# Patient Record
Sex: MALE | Race: BLACK OR AFRICAN AMERICAN | NOT HISPANIC OR LATINO | Employment: FULL TIME | URBAN - METROPOLITAN AREA
[De-identification: names, ages, dates, MRNs, and addresses within clinical notes are randomized per-mention and may not be internally consistent; named-entity substitution may affect disease eponyms.]

---

## 2021-05-01 ENCOUNTER — APPOINTMENT (OUTPATIENT)
Dept: GENERAL RADIOLOGY | Facility: HOSPITAL | Age: 31
End: 2021-05-01

## 2021-05-01 ENCOUNTER — HOSPITAL ENCOUNTER (EMERGENCY)
Facility: HOSPITAL | Age: 31
Discharge: HOME | End: 2021-05-01
Attending: EMERGENCY MEDICINE | Admitting: EMERGENCY MEDICINE

## 2021-05-01 VITALS
OXYGEN SATURATION: 96 % | TEMPERATURE: 97.2 F | RESPIRATION RATE: 16 BRPM | HEIGHT: 75 IN | DIASTOLIC BLOOD PRESSURE: 94 MMHG | BODY MASS INDEX: 32.33 KG/M2 | HEART RATE: 78 BPM | WEIGHT: 260 LBS | SYSTOLIC BLOOD PRESSURE: 153 MMHG

## 2021-05-01 DIAGNOSIS — B34.8 PARAINFLUENZA VIRUS INFECTION: Primary | ICD-10-CM

## 2021-05-01 LAB
ALBUMIN SERPL-MCNC: 4.1 G/DL (ref 3.5–5.2)
ALBUMIN/GLOB SERPL: 1.2 G/DL
ALP SERPL-CCNC: 101 U/L (ref 39–117)
ALT SERPL W P-5'-P-CCNC: 57 U/L (ref 1–41)
ANION GAP SERPL CALCULATED.3IONS-SCNC: 9.9 MMOL/L (ref 5–15)
AST SERPL-CCNC: 31 U/L (ref 1–40)
B PARAPERT DNA SPEC QL NAA+PROBE: NOT DETECTED
B PERT DNA SPEC QL NAA+PROBE: NOT DETECTED
BASOPHILS # BLD AUTO: 0.04 10*3/MM3 (ref 0–0.2)
BASOPHILS NFR BLD AUTO: 0.8 % (ref 0–1.5)
BILIRUB SERPL-MCNC: 0.2 MG/DL (ref 0–1.2)
BUN SERPL-MCNC: 12 MG/DL (ref 6–20)
BUN/CREAT SERPL: 10.6 (ref 7–25)
C PNEUM DNA NPH QL NAA+NON-PROBE: NOT DETECTED
CALCIUM SPEC-SCNC: 9 MG/DL (ref 8.6–10.5)
CHLORIDE SERPL-SCNC: 100 MMOL/L (ref 98–107)
CO2 SERPL-SCNC: 25.1 MMOL/L (ref 22–29)
CREAT SERPL-MCNC: 1.13 MG/DL (ref 0.76–1.27)
D-LACTATE SERPL-SCNC: 1.4 MMOL/L (ref 0.5–2)
DEPRECATED RDW RBC AUTO: 42 FL (ref 37–54)
EOSINOPHIL # BLD AUTO: 0.57 10*3/MM3 (ref 0–0.4)
EOSINOPHIL NFR BLD AUTO: 10.8 % (ref 0.3–6.2)
ERYTHROCYTE [DISTWIDTH] IN BLOOD BY AUTOMATED COUNT: 14.2 % (ref 12.3–15.4)
FLUAV SUBTYP SPEC NAA+PROBE: NOT DETECTED
FLUBV RNA ISLT QL NAA+PROBE: NOT DETECTED
GFR SERPL CREATININE-BSD FRML MDRD: 91 ML/MIN/1.73
GLOBULIN UR ELPH-MCNC: 3.3 GM/DL
GLUCOSE SERPL-MCNC: 97 MG/DL (ref 65–99)
HADV DNA SPEC NAA+PROBE: NOT DETECTED
HCOV 229E RNA SPEC QL NAA+PROBE: NOT DETECTED
HCOV HKU1 RNA SPEC QL NAA+PROBE: NOT DETECTED
HCOV NL63 RNA SPEC QL NAA+PROBE: NOT DETECTED
HCOV OC43 RNA SPEC QL NAA+PROBE: NOT DETECTED
HCT VFR BLD AUTO: 48.9 % (ref 37.5–51)
HGB BLD-MCNC: 16.2 G/DL (ref 13–17.7)
HMPV RNA NPH QL NAA+NON-PROBE: NOT DETECTED
HOLD SPECIMEN: NORMAL
HOLD SPECIMEN: NORMAL
HPIV1 RNA SPEC QL NAA+PROBE: NOT DETECTED
HPIV2 RNA SPEC QL NAA+PROBE: NOT DETECTED
HPIV3 RNA NPH QL NAA+PROBE: DETECTED
HPIV4 P GENE NPH QL NAA+PROBE: NOT DETECTED
IMM GRANULOCYTES # BLD AUTO: 0.01 10*3/MM3 (ref 0–0.05)
IMM GRANULOCYTES NFR BLD AUTO: 0.2 % (ref 0–0.5)
INR PPP: 0.93 (ref 0.9–1.1)
LYMPHOCYTES # BLD AUTO: 1.92 10*3/MM3 (ref 0.7–3.1)
LYMPHOCYTES NFR BLD AUTO: 36.4 % (ref 19.6–45.3)
M PNEUMO IGG SER IA-ACNC: NOT DETECTED
MAGNESIUM SERPL-MCNC: 1.8 MG/DL (ref 1.6–2.6)
MCH RBC QN AUTO: 27 PG (ref 26.6–33)
MCHC RBC AUTO-ENTMCNC: 33.1 G/DL (ref 31.5–35.7)
MCV RBC AUTO: 81.5 FL (ref 79–97)
MONOCYTES # BLD AUTO: 0.68 10*3/MM3 (ref 0.1–0.9)
MONOCYTES NFR BLD AUTO: 12.9 % (ref 5–12)
NEUTROPHILS NFR BLD AUTO: 2.06 10*3/MM3 (ref 1.7–7)
NEUTROPHILS NFR BLD AUTO: 38.9 % (ref 42.7–76)
NRBC BLD AUTO-RTO: 0 /100 WBC (ref 0–0.2)
NT-PROBNP SERPL-MCNC: <5 PG/ML (ref 0–450)
PLATELET # BLD AUTO: 245 10*3/MM3 (ref 140–450)
PMV BLD AUTO: 10.5 FL (ref 6–12)
POTASSIUM SERPL-SCNC: 3.8 MMOL/L (ref 3.5–5.2)
PROCALCITONIN SERPL-MCNC: 0.07 NG/ML (ref 0–0.25)
PROT SERPL-MCNC: 7.4 G/DL (ref 6–8.5)
PROTHROMBIN TIME: 12.3 SECONDS (ref 11.7–14.2)
RBC # BLD AUTO: 6 10*6/MM3 (ref 4.14–5.8)
RHINOVIRUS RNA SPEC NAA+PROBE: NOT DETECTED
RSV RNA NPH QL NAA+NON-PROBE: NOT DETECTED
SARS-COV-2 RNA NPH QL NAA+NON-PROBE: NOT DETECTED
SODIUM SERPL-SCNC: 135 MMOL/L (ref 136–145)
WBC # BLD AUTO: 5.28 10*3/MM3 (ref 3.4–10.8)
WHOLE BLOOD HOLD SPECIMEN: NORMAL
WHOLE BLOOD HOLD SPECIMEN: NORMAL

## 2021-05-01 PROCEDURE — 99284 EMERGENCY DEPT VISIT MOD MDM: CPT

## 2021-05-01 PROCEDURE — 83880 ASSAY OF NATRIURETIC PEPTIDE: CPT | Performed by: EMERGENCY MEDICINE

## 2021-05-01 PROCEDURE — 85610 PROTHROMBIN TIME: CPT | Performed by: EMERGENCY MEDICINE

## 2021-05-01 PROCEDURE — 71045 X-RAY EXAM CHEST 1 VIEW: CPT

## 2021-05-01 PROCEDURE — 87150 DNA/RNA AMPLIFIED PROBE: CPT | Performed by: EMERGENCY MEDICINE

## 2021-05-01 PROCEDURE — 85025 COMPLETE CBC W/AUTO DIFF WBC: CPT | Performed by: EMERGENCY MEDICINE

## 2021-05-01 PROCEDURE — 83735 ASSAY OF MAGNESIUM: CPT | Performed by: EMERGENCY MEDICINE

## 2021-05-01 PROCEDURE — 80053 COMPREHEN METABOLIC PANEL: CPT | Performed by: EMERGENCY MEDICINE

## 2021-05-01 PROCEDURE — 87147 CULTURE TYPE IMMUNOLOGIC: CPT | Performed by: EMERGENCY MEDICINE

## 2021-05-01 PROCEDURE — 83605 ASSAY OF LACTIC ACID: CPT | Performed by: EMERGENCY MEDICINE

## 2021-05-01 PROCEDURE — 0202U NFCT DS 22 TRGT SARS-COV-2: CPT | Performed by: EMERGENCY MEDICINE

## 2021-05-01 PROCEDURE — 84145 PROCALCITONIN (PCT): CPT | Performed by: EMERGENCY MEDICINE

## 2021-05-01 PROCEDURE — 87040 BLOOD CULTURE FOR BACTERIA: CPT | Performed by: EMERGENCY MEDICINE

## 2021-05-01 RX ORDER — SODIUM CHLORIDE 0.9 % (FLUSH) 0.9 %
10 SYRINGE (ML) INJECTION AS NEEDED
Status: DISCONTINUED | OUTPATIENT
Start: 2021-05-01 | End: 2021-05-01 | Stop reason: HOSPADM

## 2021-05-01 RX ADMIN — SODIUM CHLORIDE, PRESERVATIVE FREE 10 ML: 5 INJECTION INTRAVENOUS at 16:33

## 2021-05-01 NOTE — ED PROVIDER NOTES
EMERGENCY DEPARTMENT ENCOUNTER    Room Number:  19/19  Date of encounter:  5/1/2021  PCP: Provider, No Known  Historian: Patient      HPI:  Chief Complaint: Concerns he has Covid  A complete HPI/ROS/PMH/PSH/SH/FH are unobtainable due to: Not applicable  Context: Leonora Mckeon is a 31 y.o. male who presents to the ED c/o patient returned from [place] 2 days ago.  He was in [place] approximately 10 days.  About 4 days ago he started with some respiratory symptoms described as a scratchy throat, nasal congestion, and is lost the sense of taste and smell.  He has had a mild cough.  He feels weak and fatigued.  Denies any vomiting or diarrhea.  Denies any headache, chest pain, abdominal pain or any pain he had a Covid test done by saliva approximately 2 or 3 days ago at the beginning of his symptoms and it was negative.  He was vaccinated with his first dose of Pfizer he believes on April 16 or 17.  He has no history of heart problems or lung problems.  He does not take any prescription medicines.  Denies any pain or swelling in lower extremities.        Previous Episodes: No  Current Symptoms: See above    MEDICAL HISTORY REVIEWED  No old records in [facility].      PAST MEDICAL HISTORY  Active Ambulatory Problems     Diagnosis Date Noted   • No Active Ambulatory Problems     Resolved Ambulatory Problems     Diagnosis Date Noted   • No Resolved Ambulatory Problems     No Additional Past Medical History         PAST SURGICAL HISTORY  History reviewed. No pertinent surgical history.      FAMILY HISTORY  History reviewed. No pertinent family history.      SOCIAL HISTORY  Social History     Socioeconomic History   • Marital status: Single     Spouse name: Not on file   • Number of children: Not on file   • Years of education: Not on file   • Highest education level: Not on file   Tobacco Use   • Smoking status: Never Smoker   Substance and Sexual Activity   • Alcohol use: Not Currently   • Drug use: Never   • Sexual  activity: Defer         ALLERGIES  Patient has no known allergies.        REVIEW OF SYSTEMS  Review of Systems     All systems reviewed and negative except for those discussed in HPI.       PHYSICAL EXAM    I have reviewed the triage vital signs and nursing notes.    ED Triage Vitals [05/01/21 1558]   Temp Heart Rate Resp BP SpO2   97.2 °F (36.2 °C) 112 16 (!) 170/101 95 %      Temp src Heart Rate Source Patient Position BP Location FiO2 (%)   -- -- -- -- --       GENERAL: Young male pleasant no acute distress.Vital signs on my initial evaluation he has a normal heart rate in the 90s on my exam.  O2 sat is 97% on room air.  He is afebrile.  Initial blood pressure was mildly elevated and will get a recheck that.  HENT: nares patent  Head/neck/ face are symmetric without gross deformity, signs of trauma, or swelling.  On intraoral exam.  He has no erythema, swelling, or exudate.  No trismus.  No obvious adenopathy to his neck.  EYES: no scleral icterus, no conjunctival pallor.  NECK: Supple, no meningismus  CV: regular rhythm, regular rate with intact distal pulses.  No murmur or rub.  RESPIRATORY: normal effort and no respiratory distress.  Lungs are clear to auscultation bilaterally  ABDOMEN: soft and nontender.  MUSCULOSKELETAL: no deformity.  No edema.  Intact distal pulses.  NEURO: alert and appropriate, moves all extremities, follows commands.  Alert and oriented x3.  No focal motor or sensory changes.  SKIN: warm, dry    Vital signs and nursing notes reviewed.        LAB RESULTS  Recent Results (from the past 24 hour(s))   Protime-INR    Collection Time: 05/01/21  4:31 PM    Specimen: Blood   Result Value Ref Range    Protime 12.3 11.7 - 14.2 Seconds    INR 0.93 0.90 - 1.10   Comprehensive Metabolic Panel    Collection Time: 05/01/21  4:31 PM    Specimen: Blood   Result Value Ref Range    Glucose 97 65 - 99 mg/dL    BUN 12 6 - 20 mg/dL    Creatinine 1.13 0.76 - 1.27 mg/dL    Sodium 135 (L) 136 - 145 mmol/L     Potassium 3.8 3.5 - 5.2 mmol/L    Chloride 100 98 - 107 mmol/L    CO2 25.1 22.0 - 29.0 mmol/L    Calcium 9.0 8.6 - 10.5 mg/dL    Total Protein 7.4 6.0 - 8.5 g/dL    Albumin 4.10 3.50 - 5.20 g/dL    ALT (SGPT) 57 (H) 1 - 41 U/L    AST (SGOT) 31 1 - 40 U/L    Alkaline Phosphatase 101 39 - 117 U/L    Total Bilirubin 0.2 0.0 - 1.2 mg/dL    eGFR  African Amer 91 >60 mL/min/1.73    Globulin 3.3 gm/dL    A/G Ratio 1.2 g/dL    BUN/Creatinine Ratio 10.6 7.0 - 25.0    Anion Gap 9.9 5.0 - 15.0 mmol/L   Procalcitonin    Collection Time: 05/01/21  4:31 PM    Specimen: Blood   Result Value Ref Range    Procalcitonin 0.07 0.00 - 0.25 ng/mL   Lactic Acid, Plasma    Collection Time: 05/01/21  4:31 PM    Specimen: Blood   Result Value Ref Range    Lactate 1.4 0.5 - 2.0 mmol/L   BNP    Collection Time: 05/01/21  4:31 PM    Specimen: Blood   Result Value Ref Range    proBNP <5.0 0.0 - 450.0 pg/mL   Magnesium    Collection Time: 05/01/21  4:31 PM    Specimen: Blood   Result Value Ref Range    Magnesium 1.8 1.6 - 2.6 mg/dL   CBC Auto Differential    Collection Time: 05/01/21  4:31 PM    Specimen: Blood   Result Value Ref Range    WBC 5.28 3.40 - 10.80 10*3/mm3    RBC 6.00 (H) 4.14 - 5.80 10*6/mm3    Hemoglobin 16.2 13.0 - 17.7 g/dL    Hematocrit 48.9 37.5 - 51.0 %    MCV 81.5 79.0 - 97.0 fL    MCH 27.0 26.6 - 33.0 pg    MCHC 33.1 31.5 - 35.7 g/dL    RDW 14.2 12.3 - 15.4 %    RDW-SD 42.0 37.0 - 54.0 fl    MPV 10.5 6.0 - 12.0 fL    Platelets 245 140 - 450 10*3/mm3    Neutrophil % 38.9 (L) 42.7 - 76.0 %    Lymphocyte % 36.4 19.6 - 45.3 %    Monocyte % 12.9 (H) 5.0 - 12.0 %    Eosinophil % 10.8 (H) 0.3 - 6.2 %    Basophil % 0.8 0.0 - 1.5 %    Immature Grans % 0.2 0.0 - 0.5 %    Neutrophils, Absolute 2.06 1.70 - 7.00 10*3/mm3    Lymphocytes, Absolute 1.92 0.70 - 3.10 10*3/mm3    Monocytes, Absolute 0.68 0.10 - 0.90 10*3/mm3    Eosinophils, Absolute 0.57 (H) 0.00 - 0.40 10*3/mm3    Basophils, Absolute 0.04 0.00 - 0.20 10*3/mm3    Immature  Grans, Absolute 0.01 0.00 - 0.05 10*3/mm3    nRBC 0.0 0.0 - 0.2 /100 WBC   Light Blue Top    Collection Time: 05/01/21  4:31 PM   Result Value Ref Range    Extra Tube hold for add-on    Green Top (Gel)    Collection Time: 05/01/21  4:31 PM   Result Value Ref Range    Extra Tube Hold for add-ons.    Lavender Top    Collection Time: 05/01/21  4:31 PM   Result Value Ref Range    Extra Tube hold for add-on    Gold Top - SST    Collection Time: 05/01/21  4:31 PM   Result Value Ref Range    Extra Tube Hold for add-ons.    Respiratory Panel PCR w/COVID-19(SARS-CoV-2) ARMANDO/GULSHAN/KATY/PAD/COR/MAD/CRUZITO In-House, NP Swab in UTM/VTM, 3-4 HR TAT - Swab, Nasopharynx    Collection Time: 05/01/21  4:34 PM    Specimen: Nasopharynx; Swab   Result Value Ref Range    ADENOVIRUS, PCR Not Detected Not Detected    Coronavirus 229E Not Detected Not Detected    Coronavirus HKU1 Not Detected Not Detected    Coronavirus NL63 Not Detected Not Detected    Coronavirus OC43 Not Detected Not Detected    COVID19 Not Detected Not Detected - Ref. Range    Human Metapneumovirus Not Detected Not Detected    Human Rhinovirus/Enterovirus Not Detected Not Detected    Influenza A PCR Not Detected Not Detected    Influenza B PCR Not Detected Not Detected    Parainfluenza Virus 1 Not Detected Not Detected    Parainfluenza Virus 2 Not Detected Not Detected    Parainfluenza Virus 3 Detected (A) Not Detected    Parainfluenza Virus 4 Not Detected Not Detected    RSV, PCR Not Detected Not Detected    Bordetella pertussis pcr Not Detected Not Detected    Bordetella parapertussis PCR Not Detected Not Detected    Chlamydophila pneumoniae PCR Not Detected Not Detected    Mycoplasma pneumo by PCR Not Detected Not Detected       Ordered the above labs and independently reviewed the results.        RADIOLOGY  XR Chest 1 View    Result Date: 5/1/2021  ONE VIEW PORTABLE CHEST  HISTORY: Cough and loss of taste and smell. Possible COVID-19 pneumonia.  FINDINGS: The lungs are  well-expanded and clear and there is no evidence of pneumonia at the present time. The heart size is normal.  This report was finalized on 5/1/2021 5:07 PM by Dr. Moy Dubon M.D.        I ordered the above noted radiological studies. Reviewed by me and discussed with radiologist.  See dictation for official radiology interpretation.      PROCEDURES    Procedures      MEDICATIONS GIVEN IN ER    Medications   sodium chloride 0.9 % flush 10 mL (10 mL Intravenous Given 5/1/21 1633)         PROGRESS, DATA ANALYSIS, CONSULTS, AND MEDICAL DECISION MAKING    Patient signs are and symptoms are very consistent with a viral infection and very possible Covid.  We will check a chest x-ray and lab work and also repeat a Covid test on him here.  Patient is placed in isolation.  We are currently under a pandemic from the COVID19 infection.  The patient presented to the emergency department by ambulance or personal vehicle. I followed the current protocols required by Infection Control at [facility] in my evaluation and treatment of the patient. The patient was wearing a face mask during my evaluation and throughout my encounter. During my whole encounter with this patient I used appropriate personal protective equipment.  This equipment consisted of eye protection, facemask, gown, and gloves.  I applied this equipment before entering the room.      All labs have been independently reviewed by me.  All radiology studies have been reviewed by me and discussed with radiologist dictating the report.   EKG's independently viewed and interpreted by me.  Discussion below represents my analysis of pertinent findings related to patient's condition, differential diagnosis, treatment plan and final disposition.      ED Course as of May 01 1853   Sat May 01, 2021   1656 Repeat blood pressure is normal   BP: 144/93 [MM]   1725 I looked at the chest x-ray and looked unremarkable.  I reviewed the radiologist report.  He also  appreciated no acute active disease.    [MM]   1841 Parainfluenza Virus 3(!): Detected [MM]   1848 On reevaluation.  Patient is doing very well.  O2 sats 98% on room air.  Heart rate is normal and blood pressure is normal.  I informed him of the results of the test.  He has parainfluenza virus.  Informed him to do Tylenol or Motrin for any discomfort.  He does not have any pain.  Also informed him to drink plenty of fluids.  Informed him he can use some throat lozenges to help ease any discomfort he might have in his throat.  All questions answered.  He agrees with that plan.  He does have a primary care physician that he will follow up with.  I given strict instructions if he has any worsening of symptoms to return to the emergency department.    [MM]      ED Course User Index  [MM] Gonzalo Rowland MD       AS OF 18:53 EDT VITALS:    BP - 153/94  HR - 78  TEMP - 97.2 °F (36.2 °C)  02 SATS - 96%        DIAGNOSIS  Final diagnoses:   Parainfluenza virus infection         DISPOSITION  DISCHARGE    Patient discharged in stable condition.    Reviewed implications of results, diagnosis, meds, responsibility to follow up, warning signs and symptoms of possible worsening, potential complications and reasons to return to ER, including worsening of symptoms, shortness of breath, not tolerating liquids, or any concerns..    Patient/Family voiced understanding of above instructions.    Discussed plan for discharge, as there is no emergent indication for admission. Pt/family is agreeable and understands need for follow up and repeat testing.  Pt is aware that discharge does not mean that nothing is wrong but it indicates no emergency is present that requires admission and they must continue care with follow-up as given below or physician of their choice.     FOLLOW-UP  No follow-up provider specified.       Medication List      No changes were made to your prescriptions during this visit.                  Gonzalo Rowland,  MD  05/01/21 1857

## 2021-05-01 NOTE — DISCHARGE INSTRUCTIONS
As we discussed, follow-up with your primary care physician in the next 1 to 2 weeks.  Drink plenty of fluid.  Take Tylenol or Motrin for pain.  You can use throat lozenges to help alleviate some of the sore throat.  Return if you have any shortness of breath, worsening of symptoms, not tolerating liquids, or any concerns.

## 2021-05-01 NOTE — ED NOTES
Pt to ED with c/o mostly dry cough, sore throat, some loss of taste, redness to conjunctiva today L>R, denies CP, SOA, fevers, body aches, was recently in [facility] visiting family. Denies family members were ill. Pt reports neg covid test 48 hours ago upon return. Denies poor po intake, urinary c/o.     Pt wearing face mask for the duration while in ER today. This RN wore capr, gown and gloves while providing care.        Mariluz Felipe, RN  05/01/21 0357

## 2021-05-02 LAB
BACTERIA BLD CULT: ABNORMAL
BOTTLE TYPE: ABNORMAL

## 2021-05-03 LAB
BACTERIA SPEC AEROBE CULT: ABNORMAL
GRAM STN SPEC: ABNORMAL
ISOLATED FROM: ABNORMAL

## 2021-05-06 LAB — BACTERIA SPEC AEROBE CULT: NORMAL

## 2021-06-09 ENCOUNTER — HOSPITAL ENCOUNTER (EMERGENCY)
Facility: HOSPITAL | Age: 31
Discharge: HOME | End: 2021-06-09
Attending: EMERGENCY MEDICINE | Admitting: EMERGENCY MEDICINE

## 2021-06-09 VITALS
DIASTOLIC BLOOD PRESSURE: 92 MMHG | HEIGHT: 74 IN | OXYGEN SATURATION: 98 % | SYSTOLIC BLOOD PRESSURE: 136 MMHG | HEART RATE: 78 BPM | RESPIRATION RATE: 16 BRPM | TEMPERATURE: 98.3 F | WEIGHT: 260 LBS | BODY MASS INDEX: 33.37 KG/M2

## 2021-06-09 DIAGNOSIS — L02.411 ABSCESS OF RIGHT AXILLA: Primary | ICD-10-CM

## 2021-06-09 DIAGNOSIS — L73.2 HYDRADENITIS: ICD-10-CM

## 2021-06-09 PROCEDURE — 99282 EMERGENCY DEPT VISIT SF MDM: CPT

## 2021-06-09 RX ORDER — SULFAMETHOXAZOLE AND TRIMETHOPRIM 800; 160 MG/1; MG/1
2 TABLET ORAL 2 TIMES DAILY
Qty: 40 TABLET | Refills: 0 | OUTPATIENT
Start: 2021-06-09 | End: 2021-11-24

## 2021-06-09 RX ORDER — LIDOCAINE HYDROCHLORIDE AND EPINEPHRINE 10; 10 MG/ML; UG/ML
10 INJECTION, SOLUTION INFILTRATION; PERINEURAL ONCE
Status: DISCONTINUED | OUTPATIENT
Start: 2021-06-09 | End: 2021-06-09 | Stop reason: HOSPADM

## 2021-06-09 NOTE — DISCHARGE INSTRUCTIONS
Warm compresses to area.    Return to ER for any worsening symptoms.    Follow-up with general surgeon if no improvement.

## 2021-06-09 NOTE — ED TRIAGE NOTES
Patient to er from home with c/o he thinks he is getting an abscess under his right arm, which is causing pain in right arm. Patient stated he had abscess there and drained a few years ago. Patient has mask on in triage along with staff.

## 2021-06-09 NOTE — ED PROVIDER NOTES
EMERGENCY DEPARTMENT ENCOUNTER    Room Number:  05/05  Date of encounter:  6/9/2021  PCP: Provider, No Known  Historian: Patient      I used full protective equipment while examining this patient.  This includes face mask, gloves and protective eyewear.  I washed my hands before entering the room and immediately upon leaving the room      HPI:  Chief Complaint: Swollen areas to right armpit  A complete HPI/ROS/PMH/PSH/SH/FH are unobtainable due to: Nothing    Context: Leonora Mckeon is a 31 y.o. male who presents to the ED c/o 1 week history of swollen, tender areas to right axilla.  Patient states these started gradually and have become slowly worse over the past several days.  Patient denies any fevers, chills.  He states several years ago he had similar abscesses in his left axilla that had to be drained.  Patient is otherwise healthy.  He denies any underlying comorbidities.    Review of Medical Records  Reviewed patient's last ER visit from 5/1/2021.  Patient seen for parainfluenza virus.    PAST MEDICAL HISTORY  Active Ambulatory Problems     Diagnosis Date Noted   • No Active Ambulatory Problems     Resolved Ambulatory Problems     Diagnosis Date Noted   • No Resolved Ambulatory Problems     No Additional Past Medical History         PAST SURGICAL HISTORY  No past surgical history on file.      FAMILY HISTORY  No family history on file.      SOCIAL HISTORY  Social History     Socioeconomic History   • Marital status: Single     Spouse name: Not on file   • Number of children: Not on file   • Years of education: Not on file   • Highest education level: Not on file   Tobacco Use   • Smoking status: Never Smoker   Substance and Sexual Activity   • Alcohol use: Not Currently   • Drug use: Never   • Sexual activity: Defer         ALLERGIES  Patient has no known allergies.        REVIEW OF SYSTEMS  All systems reviewed and negative except for those discussed in HPI.       PHYSICAL EXAM    I have reviewed the  triage vital signs and nursing notes.    ED Triage Vitals [06/09/21 1340]   Temp Heart Rate Resp BP SpO2   98.3 °F (36.8 °C) 95 -- -- 98 %      Temp src Heart Rate Source Patient Position BP Location FiO2 (%)   Tympanic -- -- -- --       Physical Exam  GENERAL: Alert, oriented, not distressed  HENT: head atraumatic, no nuchal rigidity  EYES: no scleral icterus, EOMI  CV: regular rhythm, regular rate, no murmur  RESPIRATORY: normal effort, CTA  ABDOMEN: soft, nontender  MUSCULOSKELETAL: Multiple hard, tender, indurated areas to right axilla.  No significant erythema or discharge.  Chronic appearing indurated areas to the left axilla.  NEURO: alert, moves all extremities, follows commands  SKIN: warm, dry    MEDICATIONS GIVEN IN ER    Medications   lidocaine 1% - EPINEPHrine 1:[id number] (XYLOCAINE W/EPI) 1 %-1:091379 injection 10 mL (10 mL Injection Not Given 6/9/21 1505)         PROGRESS, DATA ANALYSIS, CONSULTS, AND MEDICAL DECISION MAKING    All labs have been independently reviewed by me.  All radiology studies have been reviewed by me and discussed with radiologist dictating the report.   EKG's independently viewed and interpreted by me.  Discussion below represents my analysis of pertinent findings related to patient's condition, differential diagnosis, treatment plan and final disposition.    I have discussed case with Dr. Harrington, emergency room physician.  He has performed his own bedside examination and agrees with treatment plan.    ED Course as of Jun 09 1505   Wed Jun 09, 2021   1430 Patient presents with swollen, tender areas to right axilla.  I suspect patient has abscesses or hidradenitis suppurativa.  Plan for I&D.    [EE]   1454  I offered I&D, however [place] would rather not have any wounds to his axilla.  Patient states he would rather do antibiotics and see if this helps.  Believe this is reasonable he is nontoxic-appearing.  He understands to follow back up with us  or general surgeon if his symptoms worsen.    [EE]      ED Course User Index  [EE] Chepe Dang PA       AS OF 15:05 EDT VITALS:    BP - 136/92  HR - 95  TEMP - 98.3 °F (36.8 °C) (Tympanic)  O2 SATS - 98%        DIAGNOSIS  Final diagnoses:   Abscess of right axilla   Hydradenitis         DISPOSITION  Charged           Chepe Dang PA  06/09/21 3685

## 2021-06-09 NOTE — ED PROVIDER NOTES
Pt presents to the ED complaining of right axillary abscess.  Patient states the lesion has been there for approximately a week.  He states he has had the same thing in his left axilla previously and had to have it drained approximately 1 year ago.  The patient denies fevers, chills, numbness, weakness or tingling.    On exam, pt is alert and orient x3 in no acute distress  Right axilla has a swollen tender fluctuant area that is consistent with an abscess with no surrounding erythema  Neurovascular intact in right hand    I agree with midlevel plan to I&D the abscess and place the patient on antibiotics with outpatient follow-up    PPE  Pt does not present with symptoms for COVID19; however, I was wearing a mask and goggles throughout all patient interaction.    Pt refused I&D and wanted only abx. Advised to RIW    The AMBER and I have discussed this patient's history, physical exam, and treatment plan.  I have reviewed the documentation and personally had a face to face interaction with the patient. I affirm the documentation and agree with the treatment and plan.  The attached note describes my personal findings.           Roberto Harrington MD  06/10/21 7572

## 2021-11-24 ENCOUNTER — HOSPITAL ENCOUNTER (EMERGENCY)
Facility: HOSPITAL | Age: 31
Discharge: HOME | End: 2021-11-24
Attending: EMERGENCY MEDICINE | Admitting: EMERGENCY MEDICINE

## 2021-11-24 VITALS
DIASTOLIC BLOOD PRESSURE: 95 MMHG | BODY MASS INDEX: 32.85 KG/M2 | HEART RATE: 88 BPM | WEIGHT: 256 LBS | HEIGHT: 74 IN | OXYGEN SATURATION: 98 % | RESPIRATION RATE: 16 BRPM | SYSTOLIC BLOOD PRESSURE: 136 MMHG | TEMPERATURE: 97.3 F

## 2021-11-24 DIAGNOSIS — L02.419 AXILLARY ABSCESS: Primary | ICD-10-CM

## 2021-11-24 PROCEDURE — 99282 EMERGENCY DEPT VISIT SF MDM: CPT

## 2021-11-24 RX ORDER — LIDOCAINE HYDROCHLORIDE AND EPINEPHRINE 10; 10 MG/ML; UG/ML
10 INJECTION, SOLUTION INFILTRATION; PERINEURAL ONCE
Status: COMPLETED | OUTPATIENT
Start: 2021-11-24 | End: 2021-11-24

## 2021-11-24 RX ORDER — SULFAMETHOXAZOLE AND TRIMETHOPRIM 800; 160 MG/1; MG/1
1 TABLET ORAL 2 TIMES DAILY
Qty: 14 TABLET | Refills: 0 | Status: SHIPPED | OUTPATIENT
Start: 2021-11-24

## 2021-11-24 RX ADMIN — LIDOCAINE HYDROCHLORIDE AND EPINEPHRINE 10 ML: 10; 10 INJECTION, SOLUTION INFILTRATION; PERINEURAL at 05:32

## 2021-11-24 NOTE — ED NOTES
To ED triage via private vehicle with co right axilla abscess x 2 days. No drainage noted.     RN wore appropriate PPE during entire encounter with patient. Patient compliant with wearing mask at this time. Proper hand hygiene performed before encounter, as deemed necessary during encounter and after completion of encounter with patient.      Kerry Haro, RN  11/24/21 0451

## 2021-11-24 NOTE — ED PROVIDER NOTES
EMERGENCY DEPARTMENT ENCOUNTER    CHIEF COMPLAINT  Chief Complaint: Abscess  History given by: Patient  History limited by: None  Room Number: 14/14  PMD: Provider, No Known      HPI:  Pt is a 31 y.o. male who presents complaining of gradual in onset but progressively worsening swelling/abscess to his right armpit that began 2 days ago.  The patient has had this previously requiring drainage in the emergency department.  He states that he has not followed up with a general surgeon.  He describes the pain is dull and achy sensation to the right armpit that is moderate in intensity and nonradiating.  He denies any associated fevers or chills, back pain, chest pain, or nausea and vomiting.  He states that the pain is worse with touch and movement and not made better by any known factor thus far.  He denies any treatment rendered prior to ED arrival.      PAST MEDICAL HISTORY  Active Ambulatory Problems     Diagnosis Date Noted   • No Active Ambulatory Problems     Resolved Ambulatory Problems     Diagnosis Date Noted   • No Resolved Ambulatory Problems     No Additional Past Medical History       PAST SURGICAL HISTORY  History reviewed. No pertinent surgical history.    FAMILY HISTORY  History reviewed. No pertinent family history.    SOCIAL HISTORY  Social History     Socioeconomic History   • Marital status: Single   Tobacco Use   • Smoking status: Never Smoker   Substance and Sexual Activity   • Alcohol use: Not Currently   • Drug use: Never   • Sexual activity: Defer       ALLERGIES  Patient has no known allergies.    REVIEW OF SYSTEMS  Review of Systems   Constitutional: Negative for activity change, appetite change and fever.   HENT: Negative for congestion and sore throat.    Eyes: Negative.    Respiratory: Negative for cough and shortness of breath.    Cardiovascular: Negative for chest pain and leg swelling.   Gastrointestinal: Negative for abdominal pain, diarrhea and vomiting.   Endocrine: Negative.     Genitourinary: Negative for decreased urine volume and dysuria.   Musculoskeletal: Negative for neck pain.   Skin: Negative for rash and wound.        Abscess   Allergic/Immunologic: Negative.    Neurological: Negative for weakness, numbness and headaches.   Hematological: Negative.    Psychiatric/Behavioral: Negative.    All other systems reviewed and are negative.      PHYSICAL EXAM  ED Triage Vitals [11/24/21 0451]   Temp Heart Rate Resp BP SpO2   97.3 °F (36.3 °C) 89 16 (!) 153/102 96 %      Temp src Heart Rate Source Patient Position BP Location FiO2 (%)   Oral Monitor Standing Right arm --       Physical Exam  Vitals and nursing note reviewed.   Constitutional:       General: He is not in acute distress.  HENT:      Head: Normocephalic and atraumatic.   Eyes:      Pupils: Pupils are equal, round, and reactive to light.   Cardiovascular:      Rate and Rhythm: Normal rate and regular rhythm.      Heart sounds: Normal heart sounds.   Pulmonary:      Effort: Pulmonary effort is normal. No respiratory distress.      Breath sounds: Normal breath sounds.   Abdominal:      Palpations: Abdomen is soft.      Tenderness: There is no abdominal tenderness. There is no guarding or rebound.   Musculoskeletal:         General: Normal range of motion.      Cervical back: Normal range of motion and neck supple.   Skin:     General: Skin is warm and dry.      Comments: Small fluctuant abscess to the right axilla   Neurological:      Mental Status: He is alert and oriented to person, place, and time.      Sensory: Sensation is intact.   Psychiatric:         Mood and Affect: Mood and affect normal.         LAB RESULTS  Lab Results (last 24 hours)     ** No results found for the last 24 hours. **          I ordered the above labs and reviewed the results    RADIOLOGY  No orders to display        I ordered the above noted radiological studies. Interpreted by radiologist.  Reviewed by me in PACS.       PROCEDURES  Incision &  "Drainage    Date/Time: 11/24/2021 5:31 AM  Performed by: Pritesh Thornton MD  Authorized by: Pritesh Thornton MD     Consent:     Consent obtained:  Verbal    Consent given by:  Patient    Risks discussed:  Bleeding, incomplete drainage and pain    Alternatives discussed:  No treatment  Location:     Type:  Abscess    Size:  2cm    Location:  Trunk    Trunk location: Right axilla.  Pre-procedure details:     Skin preparation:  Antiseptic wash  Anesthesia (see MAR for exact dosages):     Anesthesia method:  Local infiltration    Local anesthetic:  Lidocaine 1% WITH epi  Procedure type:     Complexity:  Simple  Procedure details:     Needle aspiration: yes      Needle size:  18 G    Incision types:  Single straight    Incision depth:  Dermal    Scalpel blade:  11    Wound management:  Probed and deloculated    Drainage:  Bloody and purulent    Drainage amount:  Scant    Wound treatment:  Wound left open    Packing materials:  1/4 in iodoform gauze    Amount 1/4\" iodoform:  5cm  Post-procedure details:     Patient tolerance of procedure:  Tolerated well, no immediate complications          PROGRESS AND CONSULTS     The patient was wearing a facemask upon entrance into the room and remained in such throughout their visit.  I was wearing PPE including a facemask, eye protection, as well as gloves at any point entering the room and throughout the visit      MEDICAL DECISION MAKING  Results were reviewed/discussed with the patient and they were also made aware of online access. Pt also made aware that some labs, such as cultures, will not be resulted during ER visit and follow up with PMD is necessary.     MDM  Number of Diagnoses or Management Options     Amount and/or Complexity of Data Reviewed  Review and summarize past medical records: yes (Upon medical records review, the patient was last seen and evaluated on 6/9/2021 secondary to an axillary abscess.)           DIAGNOSIS  Final diagnoses:   Axillary abscess "       DISPOSITION  DISCHARGE    Patient discharged in stable condition.    Reviewed implications of results, diagnosis, meds, responsibility to follow up, warning signs and symptoms of possible worsening, potential complications and reasons to return to ER.    Patient/Family voiced understanding of above instructions.    Discussed plan for discharge, as there is no emergent indication for admission. Patient referred to primary care provider for BP management due to today's BP. Pt/family is agreeable and understands need for follow up and repeat testing.  Pt is aware that discharge does not mean that nothing is wrong but it indicates no emergency is present that requires admission and they must continue care with follow-up as given below or physician of their choice.     FOLLOW-UP  Daniel Gomez MD  [address] [id number] [phone]    Schedule an appointment as soon as possible for a visit       [facility] REFERRAL SERVICE  [place] [address]  [phone]  Schedule an appointment as soon as possible for a visit            Medication List      Changed    sulfamethoxazole-trimethoprim 800-160 MG per tablet  Commonly known as: BACTRIM DS,SEPTRA DS  Take 1 tablet by mouth 2 (Two) Times a Day.  What changed: how much to take           Where to Get Your Medications      You can get these medications from any pharmacy    Bring a paper prescription for each of these medications  · sulfamethoxazole-trimethoprim 800-160 MG per tablet           Latest Documented Vital Signs:  As of 06:51 EST  BP- 136/95 HR- 88 Temp- 97.3 °F (36.3 °C) (Oral) O2 sat- 98%         Pritesh Thornton MD  11/24/21 0554

## 2025-01-08 ENCOUNTER — HOSPITAL ENCOUNTER (EMERGENCY)
Facility: HOSPITAL | Age: 35
Discharge: HOME | End: 2025-01-08
Attending: EMERGENCY MEDICINE | Admitting: EMERGENCY MEDICINE
Payer: COMMERCIAL

## 2025-01-08 VITALS
TEMPERATURE: 98.9 F | BODY MASS INDEX: 31.44 KG/M2 | SYSTOLIC BLOOD PRESSURE: 158 MMHG | RESPIRATION RATE: 18 BRPM | DIASTOLIC BLOOD PRESSURE: 109 MMHG | HEIGHT: 74 IN | WEIGHT: 245 LBS | HEART RATE: 81 BPM | OXYGEN SATURATION: 98 %

## 2025-01-08 DIAGNOSIS — L02.91 ABSCESS: Primary | ICD-10-CM

## 2025-01-08 PROCEDURE — 99283 EMERGENCY DEPT VISIT LOW MDM: CPT

## 2025-01-08 PROCEDURE — 25010000002 LIDOCAINE 1% - EPINEPHRINE 1:100000 1 %-1:100000 SOLUTION: Performed by: EMERGENCY MEDICINE

## 2025-01-08 RX ORDER — CLINDAMYCIN HYDROCHLORIDE 150 MG/1
450 CAPSULE ORAL 4 TIMES DAILY
Qty: 84 CAPSULE | Refills: 0 | Status: SHIPPED | OUTPATIENT
Start: 2025-01-08 | End: 2025-01-15

## 2025-01-08 RX ORDER — LIDOCAINE HYDROCHLORIDE AND EPINEPHRINE 10; 10 MG/ML; UG/ML
10 INJECTION, SOLUTION INFILTRATION; PERINEURAL ONCE
Status: COMPLETED | OUTPATIENT
Start: 2025-01-08 | End: 2025-01-08

## 2025-01-08 RX ADMIN — LIDOCAINE HYDROCHLORIDE,EPINEPHRINE BITARTRATE 10 ML: 10; .01 INJECTION, SOLUTION INFILTRATION; PERINEURAL at 13:24

## 2025-01-08 NOTE — ED PROVIDER NOTES
EMERGENCY DEPARTMENT ENCOUNTER    Room Number:  41/41  PCP: Provider, No Known    HPI:  Chief Complaint: Abscess  A complete HPI/ROS/PMH/PSH/SH/FH are unobtainable due to: None  Context: Leonora Mckeon is a 35 y.o. male who presents to the ED c/o acute abscess to the left axilla.  This happened to him in the past, most recently in 2017 or 2019 by his recollection.  He states that most recently this pain has been ongoing for the past week.  2 days ago started draining spontaneously.  No fever.        PAST MEDICAL HISTORY  Active Ambulatory Problems     Diagnosis Date Noted    No Active Ambulatory Problems     Resolved Ambulatory Problems     Diagnosis Date Noted    No Resolved Ambulatory Problems     No Additional Past Medical History         PAST SURGICAL HISTORY  No past surgical history on file.      FAMILY HISTORY  No family history on file.      SOCIAL HISTORY  Social History     Socioeconomic History    Marital status: Single   Tobacco Use    Smoking status: Never   Substance and Sexual Activity    Alcohol use: Not Currently    Drug use: Never    Sexual activity: Defer         ALLERGIES  Patient has no known allergies.        REVIEW OF SYSTEMS  Review of Systems     Included in HPI  All systems reviewed and negative except for those discussed in HPI.       PHYSICAL EXAM  ED Triage Vitals   Temp Heart Rate Resp BP SpO2   01/08/25 1305 01/08/25 1305 01/08/25 1305 01/08/25 1320 01/08/25 1305   98.9 °F (37.2 °C) 81 18 (!) 158/109 98 %      Temp src Heart Rate Source Patient Position BP Location FiO2 (%)   01/08/25 1305 01/08/25 1305 01/08/25 1320 01/08/25 1320 --   Tympanic Monitor Lying Right arm        Physical Exam      GENERAL: no acute distress  HENT: nares patent  EYES: no scleral icterus  CV: regular rhythm, normal rate  RESPIRATORY: normal effort  ABDOMEN: soft  MUSCULOSKELETAL: no deformity  NEURO: alert, moves all extremities, follows commands  PSYCH:  calm, cooperative  SKIN: warm, dry, spontaneously  draining abscess to the left axilla    Vital signs and nursing notes reviewed.          LAB RESULTS  No results found for this or any previous visit (from the past 24 hours).    Ordered the above labs and reviewed the results.        RADIOLOGY  No Radiology Exams Resulted Within Past 24 Hours    Ordered the above noted radiological studies. Reviewed by me in PACS.        MEDICATIONS GIVEN IN ER  Medications   lidocaine 1% - EPINEPHrine 1:380975 (XYLOCAINE W/EPI) 1 %-1:795627 injection 10 mL (10 mL Injection Given by Other 1/8/25 1324)         ORDERS PLACED DURING THIS VISIT:  No orders of the defined types were placed in this encounter.        OUTPATIENT MEDICATION MANAGEMENT:  No current Epic-ordered facility-administered medications on file.     Current Outpatient Medications Ordered in Epic   Medication Sig Dispense Refill    clindamycin (CLEOCIN) 150 MG capsule Take 3 capsules by mouth 4 (Four) Times a Day for 7 days. 84 capsule 0    sulfamethoxazole-trimethoprim (BACTRIM DS,SEPTRA DS) 800-160 MG per tablet Take 1 tablet by mouth 2 (Two) Times a Day. 14 tablet 0       PROCEDURES  Procedures          MEDICAL DECISION MAKING, PROGRESS, and CONSULTS    Discussion below represents my analysis of pertinent findings related to patient's condition, differential diagnosis, treatment plan and final disposition.            Differential diagnosis:    Abscess, cellulitis, hidradenitis suppurativa                 Independent interpretation of labs, radiology studies, and discussions with consultants:  ED Course as of 01/08/25 1434   Wed Jan 08, 2025   1428 I performed bedside ultrasound.  There is no drainable fluid collection on ultrasound. [TD]      ED Course User Index  [TD] Francisco Hartley II, MD       Encouraged to follow-up with PCP in order to discuss possible therapeutic options for suspected hidradenitis suppurativa.    DIAGNOSIS  Final diagnoses:   Abscess         DISPOSITION  DISCHARGE    FOLLOW-UP  ISELA Sousa  MD Victor Manuel  [address]  [address]  [facility] [address]  [phone]    Schedule an appointment as soon as possible for a visit in 1 week  to discuss the possibility that you have hidradenitis suppurative and different treatment options    PATIENT CONNECTION - [facility] [address]  [phone]  Schedule an appointment as soon as possible for a visit            Medication List        New Prescriptions      clindamycin 150 MG capsule  Commonly known as: CLEOCIN  Take 3 capsules by mouth 4 (Four) Times a Day for 7 days.               Where to Get Your Medications        These medications were sent to [facility] [place]  [address] [id number]      Hours: Monday to Friday 7 AM to 6 PM, Saturday & Sunday 8 AM to 4:30 PM (Closed 12 PM to 12:30 PM) Phone: [phone]   clindamycin 150 MG capsule             Latest Documented Vital Signs:  As of 14:34 EST  BP- (!) 158/109 HR- 81 Temp- 98.9 °F (37.2 °C) (Tympanic) O2 sat- 98%      --    Please note that portions of this were completed with a voice recognition program.       Note Disclaimer: At [facility], we believe that sharing information builds trust and better relationships. You are receiving this note because you are receiving care at [facility] or recently visited. It is possible you will see health information before a provider has talked with you about it. This kind of information can be easy to misunderstand. To help you fully understand what it means for your health, we urge you to discuss this note with your provider.         Francisco Hartley II, MD  01/08/25 1801